# Patient Record
Sex: FEMALE | Race: WHITE | URBAN - METROPOLITAN AREA
[De-identification: names, ages, dates, MRNs, and addresses within clinical notes are randomized per-mention and may not be internally consistent; named-entity substitution may affect disease eponyms.]

---

## 2022-11-16 ENCOUNTER — OFFICE VISIT (OUTPATIENT)
Dept: URBAN - METROPOLITAN AREA CLINIC 50 | Facility: CLINIC | Age: 67
End: 2022-11-16
Payer: MEDICARE

## 2022-11-16 DIAGNOSIS — D31.90 BENIGN NEOPLASM OF UNSPECIFIED PART OF UNSPECIFIED EYE: ICD-10-CM

## 2022-11-16 DIAGNOSIS — H40.011 OPEN ANGLE WITH BORDERLINE FINDINGS, LOW RISK, RIGHT EYE: Primary | ICD-10-CM

## 2022-11-16 PROCEDURE — 76514 ECHO EXAM OF EYE THICKNESS: CPT | Performed by: OPHTHALMOLOGY

## 2022-11-16 PROCEDURE — 92133 CPTRZD OPH DX IMG PST SGM ON: CPT | Performed by: OPHTHALMOLOGY

## 2022-11-16 PROCEDURE — 99213 OFFICE O/P EST LOW 20 MIN: CPT | Performed by: OPHTHALMOLOGY

## 2022-11-16 ASSESSMENT — INTRAOCULAR PRESSURE
OD: 20
OS: 20
OD: 22

## 2022-11-16 NOTE — IMPRESSION/PLAN
Impression: Open angle with borderline findings, low risk, right eye: H40.011. Plan: Patient's intraocular pressure is within acceptable range and examination is unchanged. No glaucoma medication/treatment needed at this time.  Will continue to monitor

## 2022-11-16 NOTE — IMPRESSION/PLAN
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 Plan: Condition is stable. Will continue to monitor.

## 2023-05-12 ENCOUNTER — TESTING ONLY (OUTPATIENT)
Dept: URBAN - METROPOLITAN AREA CLINIC 50 | Facility: CLINIC | Age: 68
End: 2023-05-12
Payer: MEDICARE

## 2023-05-12 DIAGNOSIS — H40.011 OPEN ANGLE WITH BORDERLINE FINDINGS, LOW RISK, RIGHT EYE: Primary | ICD-10-CM

## 2023-05-17 ENCOUNTER — OFFICE VISIT (OUTPATIENT)
Dept: URBAN - METROPOLITAN AREA CLINIC 50 | Facility: CLINIC | Age: 68
End: 2023-05-17
Payer: MEDICARE

## 2023-05-17 DIAGNOSIS — H02.834 DERMATOCHALASIS OF LEFT UPPER LID: ICD-10-CM

## 2023-05-17 DIAGNOSIS — H40.011 OPEN ANGLE WITH BORDERLINE FINDINGS, LOW RISK, RIGHT EYE: Primary | ICD-10-CM

## 2023-05-17 DIAGNOSIS — H02.831 DERMATOCHALASIS OF RIGHT UPPER LID: ICD-10-CM

## 2023-05-17 DIAGNOSIS — D31.90 BENIGN NEOPLASM OF UNSPECIFIED PART OF UNSPECIFIED EYE: ICD-10-CM

## 2023-05-17 DIAGNOSIS — Z96.1 PRESENCE OF INTRAOCULAR LENS: ICD-10-CM

## 2023-05-17 PROCEDURE — 99214 OFFICE O/P EST MOD 30 MIN: CPT | Performed by: OPHTHALMOLOGY

## 2023-05-17 ASSESSMENT — INTRAOCULAR PRESSURE
OS: 18
OD: 18

## 2023-11-27 ENCOUNTER — OFFICE VISIT (OUTPATIENT)
Dept: URBAN - METROPOLITAN AREA CLINIC 50 | Facility: CLINIC | Age: 68
End: 2023-11-27
Payer: MEDICARE

## 2023-11-27 DIAGNOSIS — H02.834 DERMATOCHALASIS OF LEFT UPPER LID: ICD-10-CM

## 2023-11-27 DIAGNOSIS — H40.011 OPEN ANGLE WITH BORDERLINE FINDINGS, LOW RISK, RIGHT EYE: ICD-10-CM

## 2023-11-27 DIAGNOSIS — Z96.1 PRESENCE OF INTRAOCULAR LENS: ICD-10-CM

## 2023-11-27 DIAGNOSIS — H02.831 DERMATOCHALASIS OF RIGHT UPPER LID: ICD-10-CM

## 2023-11-27 DIAGNOSIS — H40.013 OPEN ANGLE WITH BORDERLINE FINDINGS, LOW RISK, BILATERAL: ICD-10-CM

## 2023-11-27 PROCEDURE — 92133 CPTRZD OPH DX IMG PST SGM ON: CPT | Performed by: OPHTHALMOLOGY

## 2023-11-27 PROCEDURE — 99214 OFFICE O/P EST MOD 30 MIN: CPT | Performed by: OPHTHALMOLOGY

## 2023-11-27 ASSESSMENT — INTRAOCULAR PRESSURE
OD: 25
OS: 22
OD: 22
OS: 24

## 2024-01-19 ENCOUNTER — TECH ONLY (OUTPATIENT)
Dept: URBAN - METROPOLITAN AREA CLINIC 50 | Facility: CLINIC | Age: 69
End: 2024-01-19
Payer: MEDICARE

## 2024-01-19 DIAGNOSIS — D31.90 BENIGN NEOPLASM OF UNSPECIFIED PART OF UNSPECIFIED EYE: Primary | ICD-10-CM

## 2024-01-22 ENCOUNTER — OFFICE VISIT (OUTPATIENT)
Dept: URBAN - METROPOLITAN AREA CLINIC 50 | Facility: CLINIC | Age: 69
End: 2024-01-22
Payer: MEDICARE

## 2024-01-22 DIAGNOSIS — G43.101 MIGRAINE WITH AURA, NOT INTRACTABLE, WITH STATUS MIGRAINOSUS: Primary | ICD-10-CM

## 2024-01-22 PROCEDURE — 92083 EXTENDED VISUAL FIELD XM: CPT | Performed by: OPHTHALMOLOGY

## 2024-01-22 PROCEDURE — 99213 OFFICE O/P EST LOW 20 MIN: CPT | Performed by: OPHTHALMOLOGY

## 2024-01-22 ASSESSMENT — INTRAOCULAR PRESSURE
OS: 17
OD: 17

## 2025-03-28 ENCOUNTER — OFFICE VISIT (OUTPATIENT)
Dept: URBAN - METROPOLITAN AREA CLINIC 50 | Facility: CLINIC | Age: 70
End: 2025-03-28
Payer: MEDICARE

## 2025-03-28 DIAGNOSIS — D31.90 BENIGN NEOPLASM OF UNSPECIFIED PART OF UNSPECIFIED EYE: ICD-10-CM

## 2025-03-28 DIAGNOSIS — Z96.1 PRESENCE OF INTRAOCULAR LENS: ICD-10-CM

## 2025-03-28 DIAGNOSIS — H40.013 OPEN ANGLE WITH BORDERLINE FINDINGS, LOW RISK, BILATERAL: Primary | ICD-10-CM

## 2025-03-28 PROCEDURE — 92083 EXTENDED VISUAL FIELD XM: CPT | Performed by: OPTOMETRIST

## 2025-03-28 PROCEDURE — 92133 CPTRZD OPH DX IMG PST SGM ON: CPT | Performed by: OPTOMETRIST

## 2025-03-28 PROCEDURE — 99204 OFFICE O/P NEW MOD 45 MIN: CPT | Performed by: OPTOMETRIST
